# Patient Record
Sex: MALE | Race: WHITE | Employment: OTHER | URBAN - METROPOLITAN AREA
[De-identification: names, ages, dates, MRNs, and addresses within clinical notes are randomized per-mention and may not be internally consistent; named-entity substitution may affect disease eponyms.]

---

## 2023-11-27 ENCOUNTER — HOSPITAL ENCOUNTER (INPATIENT)
Age: 33
LOS: 10 days | Discharge: HOME | DRG: 751 | End: 2023-12-07
Attending: EMERGENCY MEDICINE | Admitting: PSYCHIATRY & NEUROLOGY
Payer: MEDICAID

## 2023-11-27 DIAGNOSIS — F32.A DEPRESSION, UNSPECIFIED DEPRESSION TYPE: Primary | ICD-10-CM

## 2023-11-27 LAB
ALBUMIN SERPL-MCNC: 4.2 G/DL (ref 3.5–4.6)
ALP SERPL-CCNC: 96 U/L (ref 35–104)
ALT SERPL-CCNC: 50 U/L (ref 0–41)
AMPHET UR QL SCN: ABNORMAL
ANION GAP SERPL CALCULATED.3IONS-SCNC: 12 MEQ/L (ref 9–15)
APAP SERPL-MCNC: <5 UG/ML (ref 10–30)
AST SERPL-CCNC: 21 U/L (ref 0–40)
BACTERIA URNS QL MICRO: ABNORMAL /HPF
BARBITURATES UR QL SCN: ABNORMAL
BASOPHILS # BLD: 0 K/UL (ref 0–0.2)
BASOPHILS NFR BLD: 0.3 %
BENZODIAZ UR QL SCN: ABNORMAL
BILIRUB SERPL-MCNC: 0.3 MG/DL (ref 0.2–0.7)
BILIRUB UR QL STRIP: NEGATIVE
BUN SERPL-MCNC: 12 MG/DL (ref 6–20)
CALCIUM SERPL-MCNC: 9 MG/DL (ref 8.5–9.9)
CANNABINOIDS UR QL SCN: POSITIVE
CHLORIDE SERPL-SCNC: 103 MEQ/L (ref 95–107)
CHOLEST SERPL-MCNC: 136 MG/DL (ref 0–199)
CK SERPL-CCNC: 62 U/L (ref 0–190)
CLARITY UR: CLEAR
CO2 SERPL-SCNC: 23 MEQ/L (ref 20–31)
COCAINE UR QL SCN: ABNORMAL
COLOR UR: YELLOW
CREAT SERPL-MCNC: 0.46 MG/DL (ref 0.7–1.2)
DRUG SCREEN COMMENT UR-IMP: ABNORMAL
EOSINOPHIL # BLD: 0 K/UL (ref 0–0.7)
EOSINOPHIL NFR BLD: 0.2 %
EPI CELLS #/AREA URNS AUTO: ABNORMAL /HPF (ref 0–5)
ERYTHROCYTE [DISTWIDTH] IN BLOOD BY AUTOMATED COUNT: 13.2 % (ref 11.5–14.5)
ETHANOL PERCENT: NORMAL G/DL
ETHANOLAMINE SERPL-MCNC: <10 MG/DL (ref 0–0.08)
FENTANYL SCREEN, URINE: ABNORMAL
GLOBULIN SER CALC-MCNC: 2.8 G/DL (ref 2.3–3.5)
GLUCOSE SERPL-MCNC: 94 MG/DL (ref 70–99)
GLUCOSE UR STRIP-MCNC: NEGATIVE MG/DL
HCT VFR BLD AUTO: 50.1 % (ref 42–52)
HDLC SERPL-MCNC: 36 MG/DL (ref 40–59)
HGB BLD-MCNC: 16.5 G/DL (ref 14–18)
HGB UR QL STRIP: ABNORMAL
HYALINE CASTS #/AREA URNS AUTO: ABNORMAL /HPF (ref 0–5)
KETONES UR STRIP-MCNC: ABNORMAL MG/DL
LDLC SERPL CALC-MCNC: 83 MG/DL (ref 0–129)
LEUKOCYTE ESTERASE UR QL STRIP: NEGATIVE
LYMPHOCYTES # BLD: 1.5 K/UL (ref 1–4.8)
LYMPHOCYTES NFR BLD: 14 %
MCH RBC QN AUTO: 29 PG (ref 27–31.3)
MCHC RBC AUTO-ENTMCNC: 32.9 % (ref 33–37)
MCV RBC AUTO: 88 FL (ref 79–92.2)
METHADONE UR QL SCN: ABNORMAL
MONOCYTES # BLD: 0.4 K/UL (ref 0.2–0.8)
MONOCYTES NFR BLD: 3.5 %
NEUTROPHILS # BLD: 8.9 K/UL (ref 1.4–6.5)
NEUTS SEG NFR BLD: 81.7 %
NITRITE UR QL STRIP: POSITIVE
OPIATES UR QL SCN: ABNORMAL
OXYCODONE UR QL SCN: ABNORMAL
PCP UR QL SCN: ABNORMAL
PH UR STRIP: 6 [PH] (ref 5–9)
PLATELET # BLD AUTO: 335 K/UL (ref 130–400)
POTASSIUM SERPL-SCNC: 3.6 MEQ/L (ref 3.4–4.9)
PROPOXYPH UR QL SCN: ABNORMAL
PROT SERPL-MCNC: 7 G/DL (ref 6.3–8)
PROT UR STRIP-MCNC: NEGATIVE MG/DL
RBC # BLD AUTO: 5.69 M/UL (ref 4.7–6.1)
RBC #/AREA URNS AUTO: ABNORMAL /HPF (ref 0–5)
SALICYLATES SERPL-MCNC: <0.3 MG/DL (ref 15–30)
SODIUM SERPL-SCNC: 138 MEQ/L (ref 135–144)
SP GR UR STRIP: 1.01 (ref 1–1.03)
TRIGL SERPL-MCNC: 83 MG/DL (ref 0–150)
TSH SERPL-MCNC: 1.59 UIU/ML (ref 0.44–3.86)
URINE REFLEX TO CULTURE: ABNORMAL
UROBILINOGEN UR STRIP-ACNC: 0.2 E.U./DL
WBC # BLD AUTO: 10.9 K/UL (ref 4.8–10.8)
WBC #/AREA URNS AUTO: ABNORMAL /HPF (ref 0–5)

## 2023-11-27 PROCEDURE — 80143 DRUG ASSAY ACETAMINOPHEN: CPT

## 2023-11-27 PROCEDURE — 80053 COMPREHEN METABOLIC PANEL: CPT

## 2023-11-27 PROCEDURE — 80179 DRUG ASSAY SALICYLATE: CPT

## 2023-11-27 PROCEDURE — 85025 COMPLETE CBC W/AUTO DIFF WBC: CPT

## 2023-11-27 PROCEDURE — 99285 EMERGENCY DEPT VISIT HI MDM: CPT

## 2023-11-27 PROCEDURE — 36415 COLL VENOUS BLD VENIPUNCTURE: CPT

## 2023-11-27 PROCEDURE — 82550 ASSAY OF CK (CPK): CPT

## 2023-11-27 PROCEDURE — 82077 ASSAY SPEC XCP UR&BREATH IA: CPT

## 2023-11-27 PROCEDURE — 80061 LIPID PANEL: CPT

## 2023-11-27 PROCEDURE — 1240000000 HC EMOTIONAL WELLNESS R&B

## 2023-11-27 PROCEDURE — 81001 URINALYSIS AUTO W/SCOPE: CPT

## 2023-11-27 PROCEDURE — 80307 DRUG TEST PRSMV CHEM ANLYZR: CPT

## 2023-11-27 PROCEDURE — 84443 ASSAY THYROID STIM HORMONE: CPT

## 2023-11-27 RX ORDER — HALOPERIDOL 5 MG/ML
5 INJECTION INTRAMUSCULAR EVERY 6 HOURS PRN
Status: DISCONTINUED | OUTPATIENT
Start: 2023-11-27 | End: 2023-12-07 | Stop reason: HOSPADM

## 2023-11-27 RX ORDER — HALOPERIDOL 5 MG/1
5 TABLET ORAL EVERY 6 HOURS PRN
Status: DISCONTINUED | OUTPATIENT
Start: 2023-11-27 | End: 2023-12-07 | Stop reason: HOSPADM

## 2023-11-27 RX ORDER — TRAZODONE HYDROCHLORIDE 50 MG/1
50 TABLET ORAL NIGHTLY PRN
Status: DISCONTINUED | OUTPATIENT
Start: 2023-11-27 | End: 2023-12-07 | Stop reason: HOSPADM

## 2023-11-27 RX ORDER — HYDROXYZINE PAMOATE 50 MG/1
50 CAPSULE ORAL EVERY 6 HOURS PRN
Status: DISCONTINUED | OUTPATIENT
Start: 2023-11-27 | End: 2023-12-07 | Stop reason: HOSPADM

## 2023-11-27 RX ORDER — ACETAMINOPHEN 325 MG/1
650 TABLET ORAL EVERY 4 HOURS PRN
Status: DISCONTINUED | OUTPATIENT
Start: 2023-11-27 | End: 2023-12-07 | Stop reason: HOSPADM

## 2023-11-27 RX ORDER — BENZTROPINE MESYLATE 1 MG/ML
2 INJECTION INTRAMUSCULAR; INTRAVENOUS 2 TIMES DAILY PRN
Status: DISCONTINUED | OUTPATIENT
Start: 2023-11-27 | End: 2023-12-07 | Stop reason: HOSPADM

## 2023-11-27 RX ORDER — MAGNESIUM HYDROXIDE/ALUMINUM HYDROXICE/SIMETHICONE 120; 1200; 1200 MG/30ML; MG/30ML; MG/30ML
30 SUSPENSION ORAL PRN
Status: DISCONTINUED | OUTPATIENT
Start: 2023-11-27 | End: 2023-12-07 | Stop reason: HOSPADM

## 2023-11-27 RX ORDER — HYDROXYZINE HYDROCHLORIDE 50 MG/ML
50 INJECTION, SOLUTION INTRAMUSCULAR EVERY 6 HOURS PRN
Status: DISCONTINUED | OUTPATIENT
Start: 2023-11-27 | End: 2023-12-07 | Stop reason: HOSPADM

## 2023-11-27 ASSESSMENT — SLEEP AND FATIGUE QUESTIONNAIRES
DO YOU USE A SLEEP AID: NO
AVERAGE NUMBER OF SLEEP HOURS: 5
SLEEP PATTERN: RESTLESSNESS
DO YOU HAVE DIFFICULTY SLEEPING: YES

## 2023-11-27 ASSESSMENT — PAIN - FUNCTIONAL ASSESSMENT: PAIN_FUNCTIONAL_ASSESSMENT: NONE - DENIES PAIN

## 2023-11-27 ASSESSMENT — ENCOUNTER SYMPTOMS
ALLERGIC/IMMUNOLOGIC NEGATIVE: 1
RESPIRATORY NEGATIVE: 1
GASTROINTESTINAL NEGATIVE: 1
EYES NEGATIVE: 1

## 2023-11-27 NOTE — ED TRIAGE NOTES
Patient to [facility]IL[facility] OF [facility] via stretcher. States he is just done does not want to live anymore. Patient took self in motorized scooter to ST[facility] himself. Per [facility] he voiced suicidal thoughts with a plan and intent 8/10 to cut self. Patient denied this upon arrival. Stated he voiced suicidal ideation but stated he was not going to harm himself and was upset that he was here.

## 2023-11-27 NOTE — ED NOTES
at bedside, patient cooperative     Anselmo Cisneros RN  11/27/23 0038
Asked patient if he is able to give urine specimen this time, reports he still can not go. Requesting to eat something at this time. Patient assisted to sitting position in bed, lunch tray given. PO fluids encouraged. Patients states he will try to provide urine specimen after he finishes eating.       Roberta Roberto RN  11/27/23 1746
Dr. Lomas Self at bedside. Pt provided water. Denies needing anything else.       Ev Clark RN  11/27/23 2957
Per pt request attempted to call mother Piter Perez [phone]. Left voice message.       Marline Sosa RN  11/27/23 2526
Pt provided water and lunch tray. Pt stated he did not want to eat lunch. Pt resting in bed.      Miranda Wisdom RN  11/27/23 7995
Pt still unable to void at this time. Pt assisted to lying position. No distress noted.      Power Gallegos RN  11/27/23 8082
Pt to [facility] bed 6. Changed into psych safe clothing. Skin intact. Pt could not void at this time. Belongings secured and no contraband found. Zone 2 notified of new pt. Lab called and notified of new orders.       Christiana Linn RN  11/27/23 2395
Reviewed pt with Dr. Monty Castillo. Reviewed past hx, current assessment, labs and home meds. Received order to admit to 3W. Asked to call 3W director Russel and to let her know of pts needs/medical acuity.       Karlie Tomas RN  11/27/23 7473
Verbal:Denies    Attempt:Denies      Self-Injurious/Self-Mutilation:Denies      Violence Current or Past: Denies      Trauma Identified: Denies    Protective Factors:    [address] when needed      Risk Factors:    Not receiving psych services  Chronic medical conditions(disabled)      Clinical Summary:    Pt presents to ED via ambulance after being pink slipped by [facility] [place] with plan to cut self. Reports increased depression and anxiety. Flat affect and poor eye contact. Pt has chronic medical conditions that has decreased his quality of life. Pt has feelings of hopelessness and poor self esteem. Reports not wanting to live anymore and wants to just give up. Denies hallucinations. Does report some paranoia. Stated he feels others are out to get him and and feels he is being watched. Reports he has not had any past mental health  issues or admissions. Denies taking any medications. Reports poor sleep, sleeping only 5 hours a night  and decrease in appetite.          Level of Care Disposition:    Per Dr. Madison Vargas, [address] RN  11/27/23 7820

## 2023-11-27 NOTE — ED PROVIDER NOTES
[facility] ED  EMERGENCY DEPARTMENT ENCOUNTER      Pt Name: Evan Martinez  MRN: [id number]  [address] [facility] [place] 1990  Date of evaluation: 11/27/2023  Provider: Nicole Pagan MD    CHIEF COMPLAINT       Chief Complaint   Patient presents with    Mental Health Problem     Patient is depressed, doesn't want to live anymore. Tasley slipped from [facility] (SI to cut self)          HISTORY OF PRESENT ILLNESS   (Location/Symptom, Timing/Onset, Context/Setting, Quality, Duration, Modifying Factors, Severity)  Note limiting factors. Evan Martinez is a 35 y.o. male who presents to the emergency department. This is a 75-year-old male with a past medical history of paraplegia who presents for suicidal ideation. Patient states that typically over the holiday season he becomes more depressed and he states that he recently he has felt suicidal.  He denies any homicidal ideation or auditory/visual hallucinations. He otherwise denies any recent fevers, chills, body aches, runny nose, cough, earache, sore throat, chest pain, shortness of breath, abdominal pain, nausea, vomiting, diarrhea, or dysuria. HPI    Nursing Notes were reviewed. REVIEW OF SYSTEMS    (2-9 systems for level 4, 10 or more for level 5)     Review of Systems   Constitutional: Negative. HENT: Negative. Eyes: Negative. Respiratory: Negative. Cardiovascular: Negative. Gastrointestinal: Negative. Endocrine: Negative. Genitourinary: Negative. Musculoskeletal: Negative. Skin: Negative. Allergic/Immunologic: Negative. Neurological: Negative. Hematological: Negative. Psychiatric/Behavioral:  Positive for suicidal ideas. All other systems reviewed and are negative. Except as noted above the remainder of the review of systems was reviewed and negative. PAST MEDICAL HISTORY   No past medical history on file. SURGICAL HISTORY     No past surgical history on file.       CURRENT

## 2023-11-28 PROCEDURE — 6370000000 HC RX 637 (ALT 250 FOR IP): Performed by: PSYCHIATRY & NEUROLOGY

## 2023-11-28 PROCEDURE — 99223 1ST HOSP IP/OBS HIGH 75: CPT | Performed by: PSYCHIATRY & NEUROLOGY

## 2023-11-28 PROCEDURE — 1240000000 HC EMOTIONAL WELLNESS R&B

## 2023-11-28 RX ORDER — QUETIAPINE FUMARATE 50 MG/1
50 TABLET, FILM COATED ORAL NIGHTLY
Status: DISCONTINUED | OUTPATIENT
Start: 2023-11-28 | End: 2023-12-07 | Stop reason: HOSPADM

## 2023-11-28 RX ADMIN — SERTRALINE 50 MG: 50 TABLET, FILM COATED ORAL at 16:28

## 2023-11-28 RX ADMIN — HYDROXYZINE PAMOATE 50 MG: 50 CAPSULE ORAL at 04:08

## 2023-11-28 RX ADMIN — QUETIAPINE FUMARATE 50 MG: 50 TABLET ORAL at 20:44

## 2023-11-28 NOTE — CARE COORDINATION
Psychosocial Assessment    Current Level of Psychosocial Functioning     Independent   Dependent  X  Minimal Assist     Comments:      Psychosocial High Risk Factors (check all that apply)    Unable to obtain meds   Chronic illness/pain  X  Substance abuse   Lack of Family Support X  Financial stress   Isolation X  Inadequate Community Resources X  Suicide attempt(s)  Not taking medications X  Victim of crime   Developmental Delay  Unable to manage personal needs  X  Age 72 or older   Homeless  No transportation   Readmission within 30 days  Unemployment  Traumatic Event    Family/Supports identified:   None    Sexual Orientation:    Patient did not disclose    Patient Strengths:  Stable Housing    Patient Barriers:   Not receiving psych services  Chronic medical conditions (disabled)    Safety plan:  Completed    CMHC/MH history:  Depression  Plan of Care:  medication management, group/individual therapies, family meetings, psycho -education, treatment team meetings to assist with stabilization    Initial Discharge Plan:    Return Home alone with caregivers/ Allied Services. Clinical Summary:  Patient is a 35year old male recently admitted to the [facility] for a mental health evaluation. Paitent reports increased depression, paranoia and anxiety. Patient has flat affect and poor eye contact. Pt has chronic medical conditions that has decreased his quality of life and has caregivers in his home to help with ADLS daily. Pt has feelings of hopelessness and poor self esteem. Reports not wanting to live anymore and wants to just give up. Patient reports he has not had any past mental health  issues or admissions. Patients family resides in [facility] and he lacks family and peer support services.     Electronically signed by JEROME Velazquez on 11/28/2023 at 11:49 AM

## 2023-11-28 NOTE — GROUP NOTE
Group Therapy Note    Date: 11/28/2023    Group Start Time: 8954  Group End Time: 1815  Group Topic: Recreational    [facility]    John Harkins RN        Group Therapy Note    Attendees: 7/21           Patient's Goal: Socialization Skills    Status After Intervention:  Improved    Participation Level: Active Listener    Participation Quality: Appropriate      Speech:  normal      Thought Process/Content: Logical      Affective Functioning: Congruent      Mood: euthymic      Level of consciousness:  Oriented x4      Response to Learning: Progressing to goal      Endings: None Reported    Modes of Intervention: Education      Discipline Responsible: Registered Nurse      Signature:   John Harkins RN

## 2023-11-29 PROCEDURE — 1240000000 HC EMOTIONAL WELLNESS R&B

## 2023-11-29 PROCEDURE — 99233 SBSQ HOSP IP/OBS HIGH 50: CPT | Performed by: PSYCHIATRY & NEUROLOGY

## 2023-11-29 PROCEDURE — 6370000000 HC RX 637 (ALT 250 FOR IP): Performed by: PSYCHIATRY & NEUROLOGY

## 2023-11-29 RX ADMIN — QUETIAPINE FUMARATE 50 MG: 50 TABLET ORAL at 20:51

## 2023-11-29 RX ADMIN — SERTRALINE 50 MG: 50 TABLET, FILM COATED ORAL at 08:29

## 2023-11-29 NOTE — CONSULTS
PODIATRIC MEDICINE AND SURGERY  CONSULT HISTORY AND PHYSICAL    Consulting Service:  Psych  Requesting Provider: Shreya Cardenas  Opinion/advice regarding: Elongated Nails  Staff Doctor:  Dot Reddy DPM    ASSESSMENT:     35 y.o. male with PMH significant for depression with SI and paranoia for who podiatry was consulted for nail debridement as he is paraplegic without transportation for outpatient visits. PLAN AND RECOMMENDATIONS:     - Nails 1-5 B/L were debrided with nail nippers without incident.   - Patient will need follow up with podiatry in 2-3 months for routine nail care. - Podiatry to sign off. Please re-consult for any questions or concerns. Patient's case to be discussed with staff, Dr. Nathaniel Tamez, who will provide final recommendations going forward    SUBJECTIVE:     HPI: This 35y.o. year old male was seen today for nail debridement. Patient states that he is unable to manage his own nail care due to paraplegia. States he used to see a podiatrist but cannot recall name, states he has been unable to see provider for some times. Patient denies nausea, vomiting, diarrhea, fevers, chills, chest pain, shortness of breath, head ache, or calf pain. No other pedal complaints. Past Medical History:   Diagnosis Date    Arthrogryposis     Paraplegia ([address])     Scoliosis        No past surgical history on file. No current facility-administered medications on file prior to encounter. No current outpatient medications on file prior to encounter. No Known Allergies    No family history on file.     Social History     Socioeconomic History    Marital status: Single     Spouse name: Not on file    Number of children: Not on file    Years of education: Not on file    Highest education level: Not on file   Occupational History    Not on file   Tobacco Use    Smoking status: Every Day    Smokeless tobacco: Never   Substance and Sexual Activity    Alcohol use: Yes    Drug use: Yes     Types:

## 2023-11-29 NOTE — CARE COORDINATION
Found long metal aries in patient's room. Pt states he uses it to grab things. Pt was agreeable to allow nurse to place it in his locker. Pt aware that he can ask his 1:1 for anything he needs.

## 2023-11-29 NOTE — GROUP NOTE
Group Therapy Note    Date: 11/29/2023    Group Start Time: 1430  Group End Time: 1500  Group Topic: Healthy Living/Wellness    [facility] 3[facility] I    , FRACISCO Salgado        Group Therapy Note    Attendees:   10/19       Patient's Goal:  To participate in group     Notes:  Appropriate behavior     Status After Intervention:  Improved    Participation Level: Interactive    Participation Quality: Appropriate      Speech:  normal      Thought Process/Content: Logical      Affective Functioning: Congruent      Mood: euthymic      Level of consciousness:  Alert and Oriented x4      Response to Learning: Progressing to goal      Endings: None Reported    Modes of Intervention: Support and Socialization      Discipline Responsible: Registered Nurse      Signature:  Tiffany Menezes RN

## 2023-11-29 NOTE — GROUP NOTE
Group Therapy Note    Date: 11/29/2023    Group Start Time: 1000  Group End Time: 1100  Group Topic: Recreational    [facility] 3[facility] Jessica Solis        Group Therapy Note    Attendees: 7       Patient's Goal:  To take my medication     Notes:  Pt was attentive     Status After Intervention:  Unchanged    Participation Level: Interactive    Participation Quality: Attentive      Speech:  normal      Thought Process/Content: Logical      Affective Functioning: Congruent      Mood: euthymic      Level of consciousness:  Alert      Response to Learning: Able to verbalize current knowledge/experience      Endings: None Reported    Modes of Intervention: Activity      Discipline Responsible: Behavorial Health Tech      Signature:   Michael Arzate

## 2023-11-29 NOTE — GROUP NOTE
Group Therapy Note    Date: 11/28/2023    Group Start Time: 1941  Group End Time: 2030  Group Topic: Wrap-Up    [facility]    Aruna Galvez        Group Therapy Note    Attendees: 10/20       Patient's Goal:  PT goal is to go to bed early and wake up early      Status After Intervention:  Unchanged    Participation Level: Interactive    Participation Quality: Appropriate      Speech:  normal      Thought Process/Content: Logical      Affective Functioning: Congruent      Mood: euthymic      Level of consciousness:  Alert      Response to Learning: Able to verbalize current knowledge/experience      Endings: None Reported    Modes of Intervention: Socialization      Discipline Responsible: PCA      Signature:  Aruna Galvez

## 2023-11-29 NOTE — GROUP NOTE
Group Therapy Note    Date: 11/29/2023    Group Start Time: 1300  Group End Time: 1330  Group Topic: Cognitive Skills    [facility]    Patricia Ruiz, RN        Group Therapy Note    Attendees: 7/18     Patient's Goal:  to learn strategies for good coping. Status After Intervention:  Improved    Participation Level: Active Listener    Participation Quality: Appropriate, Attentive, and Sharing      Speech:  normal      Thought Process/Content: Logical      Affective Functioning: Congruent      Mood: euthymic      Level of consciousness:  Alert and Oriented x4      Response to Learning: Able to verbalize/acknowledge new learning and Capable of insight      Endings: None Reported    Modes of Intervention: Education, Support, Socialization, and Problem-solving      Discipline Responsible: Registered Nurse      Signature: Isela Carter

## 2023-11-29 NOTE — CARE COORDINATION
Pt had bowel movement and used the urinal.  Pt cleaned and new brief put on pt.   Pt transferred to bed with 4 person assist.

## 2023-11-30 PROBLEM — F32.3 CURRENT SEVERE EPISODE OF MAJOR DEPRESSIVE DISORDER WITH PSYCHOTIC FEATURES WITHOUT PRIOR EPISODE (HCC): Status: ACTIVE | Noted: 2023-11-27

## 2023-11-30 PROCEDURE — 1240000000 HC EMOTIONAL WELLNESS R&B

## 2023-11-30 PROCEDURE — 6370000000 HC RX 637 (ALT 250 FOR IP): Performed by: PSYCHIATRY & NEUROLOGY

## 2023-11-30 PROCEDURE — 99232 SBSQ HOSP IP/OBS MODERATE 35: CPT | Performed by: PSYCHIATRY & NEUROLOGY

## 2023-11-30 RX ORDER — QUETIAPINE FUMARATE 25 MG/1
25 TABLET, FILM COATED ORAL EVERY MORNING
Status: DISCONTINUED | OUTPATIENT
Start: 2023-12-01 | End: 2023-12-07 | Stop reason: HOSPADM

## 2023-11-30 RX ADMIN — SERTRALINE 50 MG: 50 TABLET, FILM COATED ORAL at 08:45

## 2023-11-30 RX ADMIN — QUETIAPINE FUMARATE 50 MG: 50 TABLET ORAL at 20:47

## 2023-11-30 NOTE — GROUP NOTE
Group Therapy Note    Date: 11/30/2023    Group Start Time: 1000  Group End Time: 1100  Group Topic: Recreational    [facility] Jessica Solis        Group Therapy Note    Attendees: 8       Patient's Goal:  To finish the day    Notes:  Pt was attentive     Status After Intervention:  Unchanged    Participation Level: Interactive    Participation Quality: Attentive      Speech:  normal      Thought Process/Content: Logical      Affective Functioning: Congruent      Mood: euthymic      Level of consciousness:  Alert      Response to Learning: Able to verbalize current knowledge/experience      Endings: None Reported    Modes of Intervention: Activity      Discipline Responsible: Behavorial Health Tech      Signature:   Leslee Jeans

## 2023-11-30 NOTE — GROUP NOTE
Group Therapy Note    Date: 11/29/2023    Group Start Time: 1900  Group End Time: 2000  Group Topic: Wrap-Up    [facility]    Julianna Olvera        Group Therapy Note    Attendees: 6/19       Patient's Goal:  PT goal is to take meds        Status After Intervention:  Unchanged    Participation Level: Interactive    Participation Quality: Attentive      Speech:  normal      Thought Process/Content: Logical      Affective Functioning: Congruent      Mood: euthymic      Level of consciousness:  Attentive      Response to Learning: Able to verbalize current knowledge/experience      Endings: None Reported    Modes of Intervention: Socialization      Discipline Responsible: PCA      Signature:  Julianna Olvera

## 2023-11-30 NOTE — GROUP NOTE
Group Therapy Note    Date: 11/30/2023    Group Start Time: 1350  Group End Time: 3569  Group Topic: Nursing    [facility]    Vicky Eldridge RN        Group Therapy Note    Attendees: 8/22       Patient's Goal:  Learning the benefits of yoga- exploring different types of coping skills     Status After Intervention:  Improved    Participation Level: Minimal    Participation Quality: Attentive      Speech:  normal      Thought Process/Content: Logical      Affective Functioning: Congruent      Mood: euthymic      Level of consciousness:  Oriented x4      Response to Learning: Able to verbalize current knowledge/experience      Endings: None Reported    Modes of Intervention: Education, Socialization, and Exploration      Discipline Responsible: Registered Nurse      Signature:  Vicky Eldridge RN

## 2023-11-30 NOTE — GROUP NOTE
Group Therapy Note    Date: 11/30/2023    Group Start Time: 1300  Group End Time: 8463  Group Topic: Music Therapy    [facility]    Marcie Foreman RN        Group Therapy Note    Attendees: 8/21       Patient's Goal:   Learning coping skills through music.       Status After Intervention:  Improved    Participation Level: Interactive    Participation Quality: Sharing      Speech:  normal      Thought Process/Content: Logical      Affective Functioning: Congruent      Mood: anxious      Level of consciousness:  Alert      Response to Learning: Able to verbalize current knowledge/experience      Endings: None Reported    Modes of Intervention: Education, Socialization, and Exploration      Discipline Responsible: Registered Nurse      Signature:  Marcie Foreman RN

## 2023-11-30 NOTE — GROUP NOTE
Group Therapy Note    Date: 11/29/2023    Group Start Time: 1900  Group End Time: 1930  Group Topic: Recreational    [facility] I    Jamia Vincent        Group Therapy Note    Attendees: 9/19       Patient's Goal:  PT was able to socialize while playing the wii        Status After Intervention:  Unchanged    Participation Level: Interactive    Participation Quality: Attentive      Speech:  normal      Thought Process/Content: Logical      Affective Functioning: Congruent      Mood: euthymic      Level of consciousness:  Attentive      Response to Learning: Able to verbalize current knowledge/experience      Endings: None Reported    Modes of Intervention: Activity      Discipline Responsible: PCA      Signature:  Jamia Vincent

## 2023-12-01 PROCEDURE — 6370000000 HC RX 637 (ALT 250 FOR IP): Performed by: PSYCHIATRY & NEUROLOGY

## 2023-12-01 PROCEDURE — 1240000000 HC EMOTIONAL WELLNESS R&B

## 2023-12-01 PROCEDURE — 90833 PSYTX W PT W E/M 30 MIN: CPT | Performed by: PSYCHIATRY & NEUROLOGY

## 2023-12-01 PROCEDURE — 99232 SBSQ HOSP IP/OBS MODERATE 35: CPT | Performed by: PSYCHIATRY & NEUROLOGY

## 2023-12-01 RX ADMIN — QUETIAPINE FUMARATE 50 MG: 50 TABLET ORAL at 20:48

## 2023-12-01 RX ADMIN — SERTRALINE 50 MG: 50 TABLET, FILM COATED ORAL at 08:23

## 2023-12-01 RX ADMIN — QUETIAPINE FUMARATE 25 MG: 25 TABLET ORAL at 08:23

## 2023-12-01 NOTE — GROUP NOTE
Group Therapy Note    Date: 12/1/2023    Group Start Time: 0900  Group End Time: 0918  Group Topic: Community Meeting    [facility] DANNY Wilkins        Group Therapy Note    Attendees: 19/22       Patient's Goal:  PT goal is to get through new medication and to also get clothes        Status After Intervention:  Unchanged    Participation Level: Interactive    Participation Quality: Attentive      Speech:  normal      Thought Process/Content: Logical      Affective Functioning: Congruent      Mood: euthymic      Level of consciousness:  Attentive      Response to Learning: Able to verbalize current knowledge/experience      Endings: None Reported    Modes of Intervention: Socialization      Discipline Responsible: PCA      Signature:  Fito Wilkins

## 2023-12-01 NOTE — GROUP NOTE
Group Therapy Note    Date: 12/1/2023    Group Start Time: 7513  Group End Time: 1115  Group Topic: Activity    [facility]    Antonino Sabillon        Group Therapy Note    Attendees: 11/22       Patient's Goal:  PT was able to socialize while playing the ball and cup game        Status After Intervention:  Unchanged    Participation Level: Interactive    Participation Quality: Attentive      Speech:  normal      Thought Process/Content: Logical      Affective Functioning: Congruent      Mood: euthymic      Level of consciousness:  Attentive      Response to Learning: Able to verbalize current knowledge/experience      Endings: None Reported    Modes of Intervention: Activity      Discipline Responsible: PCA      Signature:  Antonino Sabillon

## 2023-12-01 NOTE — GROUP NOTE
Group Therapy Note    Date: 12/1/2023    Group Start Time: 1500  Group End Time: 6522  Group Topic: Cognitive Skills    [facility] I    Rene Zamarripa        Group Therapy Note    Attendees: 10/17       Patient's Goal:  PT played pictionary      Status After Intervention:  Unchanged    Participation Level: Interactive    Participation Quality: Attentive      Speech:  normal      Thought Process/Content: Logical      Affective Functioning: Congruent      Mood: euthymic      Level of consciousness:  Attentive      Response to Learning: Able to verbalize current knowledge/experience      Endings: None Reported    Modes of Intervention: non      Discipline Responsible: ALONSO      Signature:  Rene Zamarripa

## 2023-12-02 PROCEDURE — 1240000000 HC EMOTIONAL WELLNESS R&B

## 2023-12-02 PROCEDURE — 6370000000 HC RX 637 (ALT 250 FOR IP): Performed by: PSYCHIATRY & NEUROLOGY

## 2023-12-02 RX ADMIN — QUETIAPINE FUMARATE 50 MG: 50 TABLET ORAL at 21:18

## 2023-12-02 RX ADMIN — QUETIAPINE FUMARATE 25 MG: 25 TABLET ORAL at 09:32

## 2023-12-02 RX ADMIN — SERTRALINE 50 MG: 50 TABLET, FILM COATED ORAL at 09:32

## 2023-12-02 NOTE — GROUP NOTE
Group Therapy Note    Date: 12/2/2023    Group Start Time: 1300  Group End Time: 0177  Group Topic: Healthy Living/Wellness    [facility] Jessica Solis        Group Therapy Note    Attendees: 10       Patient's Goal:  To attend group    Notes:  Pt was attentive     Status After Intervention:  Unchanged    Participation Level: Interactive    Participation Quality: Attentive      Speech:  normal      Thought Process/Content: Logical      Affective Functioning: Congruent      Mood: euthymic      Level of consciousness:  Alert      Response to Learning: Able to verbalize current knowledge/experience      Endings: None Reported    Modes of Intervention: Activity      Discipline Responsible: Behavorial Health Tech      Signature:   Elissa Juares

## 2023-12-02 NOTE — GROUP NOTE
Group Therapy Note    Date: 12/2/2023    Group Start Time: 1630  Group End Time: 1800  Group Topic: Healthy Living/Wellness    [facility]    Wesley Lopes LPN        Group Therapy Note    Attendees: 10/11       Patient's Goal:  n/a      Notes:  pt. Participated in group     Status After Intervention:  Improved    Participation Level:  Active Listener    Participation Quality: Appropriate      Speech:  normal      Thought Process/Content: Logical      Affective Functioning: Congruent        Level of consciousness:  A&o x4      Response to Learning: active listener      Endings: None Reported    Modes of Intervention: Education and Support      Discipline Responsible: Licensed Practical Nurse      Signature:  Wesley Lopes LPN

## 2023-12-02 NOTE — GROUP NOTE
Group Therapy Note    Date: 12/2/2023    Group Start Time: 1000  Group End Time: 1100  Group Topic: Recreational    [facility] Jessica Solis        Group Therapy Note    Attendees: 11       Patient's Goal:  Talk to OT    Notes:  Pt was attentive     Status After Intervention:  Unchanged    Participation Level: Interactive    Participation Quality: Attentive      Speech:  mute      Thought Process/Content: Logical      Affective Functioning: Congruent      Mood: euthymic      Level of consciousness:  Alert      Response to Learning: Able to verbalize current knowledge/experience      Endings: None Reported    Modes of Intervention: Activity      Discipline Responsible: Behavorial Health Tech      Signature:   Hodan Conteh

## 2023-12-03 PROCEDURE — 6370000000 HC RX 637 (ALT 250 FOR IP): Performed by: PSYCHIATRY & NEUROLOGY

## 2023-12-03 PROCEDURE — 1240000000 HC EMOTIONAL WELLNESS R&B

## 2023-12-03 RX ADMIN — SERTRALINE 50 MG: 50 TABLET, FILM COATED ORAL at 08:31

## 2023-12-03 RX ADMIN — QUETIAPINE FUMARATE 25 MG: 25 TABLET ORAL at 08:31

## 2023-12-03 RX ADMIN — QUETIAPINE FUMARATE 50 MG: 50 TABLET ORAL at 21:24

## 2023-12-03 NOTE — GROUP NOTE
Group Therapy Note    Date: 12/3/2023    Group Start Time: 2030  Group End Time: 2045  Group Topic: Wrap-Up    [facility]    Cora Ball RN        Group Therapy Note    Attendees: 16/22       Patient's Goal:  To see occupational Therapy    Notes:  Progressing    Status After Intervention:  Unchanged    Participation Level:  Active Listener    Participation Quality: Appropriate      Speech:  normal      Thought Process/Content: Logical      Affective Functioning: Congruent      Mood: euthymic      Level of consciousness:  Alert      Response to Learning: Progressing to goal      Endings: None Reported    Modes of Intervention: Support      Discipline Responsible: Registered Nurse      Signature:  Cora Ball RN

## 2023-12-03 NOTE — GROUP NOTE
Group Therapy Note    Date: 12/3/2023    Group Start Time: 1000  Group End Time: 1100  Group Topic: Recreational    [facility] Jessica Solis        Group Therapy Note    Attendees: 10       Patient's Goal:  To not have an accident     Notes:  Pt was attentive     Status After Intervention:  Unchanged    Participation Level: Interactive    Participation Quality: Attentive      Speech:  normal      Thought Process/Content: Logical      Affective Functioning: Congruent      Mood: euthymic      Level of consciousness:  Alert      Response to Learning: Able to verbalize current knowledge/experience      Endings: None Reported    Modes of Intervention:       Discipline Responsible: Behavorial Health Tech      Signature:   Mitzy Felder

## 2023-12-03 NOTE — GROUP NOTE
Group Therapy Note    Date: 12/3/2023    Group Start Time: 1400  Group End Time: 1430  Group Topic: Healthy Living/Wellness    [facility] 3W Jessica Solis        Group Therapy Note    Attendees: 10       Patient's Goal:  To attend group    Notes:  Pt was attentive     Status After Intervention:  Unchanged    Participation Level: Interactive    Participation Quality: Attentive      Speech:  normal      Thought Process/Content: Logical      Affective Functioning: Congruent      Mood: euthymic      Level of consciousness:  Alert      Response to Learning: Able to verbalize current knowledge/experience      Endings: None Reported    Modes of Intervention: Activity      Discipline Responsible: Behavorial Health Tech      Signature:   Dary Green

## 2023-12-04 PROCEDURE — 99232 SBSQ HOSP IP/OBS MODERATE 35: CPT | Performed by: PSYCHIATRY & NEUROLOGY

## 2023-12-04 PROCEDURE — 1240000000 HC EMOTIONAL WELLNESS R&B

## 2023-12-04 PROCEDURE — 6370000000 HC RX 637 (ALT 250 FOR IP): Performed by: PSYCHIATRY & NEUROLOGY

## 2023-12-04 RX ORDER — LOPERAMIDE HYDROCHLORIDE 2 MG/1
2 CAPSULE ORAL 4 TIMES DAILY PRN
Status: DISCONTINUED | OUTPATIENT
Start: 2023-12-04 | End: 2023-12-07 | Stop reason: HOSPADM

## 2023-12-04 RX ADMIN — QUETIAPINE FUMARATE 50 MG: 50 TABLET ORAL at 21:10

## 2023-12-04 RX ADMIN — QUETIAPINE FUMARATE 25 MG: 25 TABLET ORAL at 09:28

## 2023-12-04 RX ADMIN — SERTRALINE 50 MG: 50 TABLET, FILM COATED ORAL at 09:29

## 2023-12-04 NOTE — GROUP NOTE
Group Therapy Note    Date: 2023    Group Start Time: 1000  Group End Time: 1100  Group Topic: Activity    [facility] 3W JEIMY Tony        Group Therapy Note    Attendees:          Patient's Goal:  ***    Notes:  ***    Status After Intervention:  {Status After Intervention:877194397}    Participation Level: {Participation Level:457799101}    Participation Quality: {[facility] PARTICIPATION QUALITY:110340747}      Speech:  {[facility] CD_SPEECH:26885}      Thought Process/Content: {Thought Process/Content:038656087}      Affective Functioning: {Affective Functionin}      Mood: {Mood:399218955}      Level of consciousness:  {Level of consciousness:525768798}      Response to Learnin [facility] Responses to Learnin}      Endings: {[facility] Endings:53924}    Modes of Intervention: {[facility] Modes of Intervention:744285266}      Discipline Responsible: {[facility] Multidisciplinary:701717886}      Signature:  Niko Tony

## 2023-12-04 NOTE — GROUP NOTE
Group Therapy Note    Date: 12/3/2023    Group Start Time: 1930  Group End Time: 2000  Group Topic: Wrap-Up    [facility]    Jinny Beverly RN    To attend wrap up group and state whether or not goal was met. Group Therapy Note    Attendees: 21/24       Patient's Goal:  To talk to friends    Notes:  progressing     Status After Intervention:  Unchanged    Participation Level:  Active Listener    Participation Quality: Appropriate      Speech:  normal      Thought Process/Content: Logical      Affective Functioning: Congruent      Mood: euthymic      Level of consciousness:  Alert      Response to Learning: Progressing to goal      Endings: None Reported    Modes of Intervention: Support      Discipline Responsible: Registered Nurse      Signature:  Jinny Beverly RN

## 2023-12-04 NOTE — GROUP NOTE
Group Therapy Note    Date: 12/4/2023    Group Start Time: 1200  Group End Time: 200  Group Topic: Healthy Living/Wellness    [facility] 3W DANNY Fraga        Group Therapy Note    Attendees: 11/24       Patient's Goal:  PT learned about anger management        Status After Intervention:  Unchanged    Participation Level: Interactive    Participation Quality: Attentive      Speech:  normal      Thought Process/Content: Logical      Affective Functioning: Congruent      Mood: euthymic      Level of consciousness:  Attentive      Response to Learning: Able to verbalize current knowledge/experience      Endings: None Reported    Modes of Intervention: Education      Discipline Responsible: ALONSO      Signature:  Tate Fraga

## 2023-12-04 NOTE — GROUP NOTE
Group Therapy Note    Date: 12/4/2023    Group Start Time: 1300  Group End Time: 1400  Group Topic: Recreational    [facility] 3W I    Oliver Perez        Group Therapy Note    Attendees: 10/21       Patient's Goal:  Pt played trivia and socialized      Status After Intervention:  Unchanged    Participation Level: Interactive    Participation Quality: Attentive      Speech:  normal      Thought Process/Content: Logical      Affective Functioning: Congruent      Mood: euthymic      Level of consciousness:  Attentive      Response to Learning: Able to verbalize current knowledge/experience      Endings: None Reported    Modes of Intervention: Activity      Discipline Responsible: PCA      Signature:  Oliver Perez

## 2023-12-04 NOTE — GROUP NOTE
Group Therapy Note    Date: 12/4/2023    Group Start Time: 1000  Group End Time: 1100  Group Topic: Activity    [facility]    Rene Zamarripa        Group Therapy Note    Attendees: 9/25       Patient's Goal:  PT was able to socialize while doing art group        Status After Intervention:  Unchanged    Participation Level: Interactive    Participation Quality: Attentive      Speech:  normal      Thought Process/Content: Logical      Affective Functioning: Congruent      Mood: euthymic      Level of consciousness:  Attentive      Response to Learning: Able to verbalize current knowledge/experience      Endings: None Reported    Modes of Intervention: Activity      Discipline Responsible: PCA      Signature:  Rene Zamarripa

## 2023-12-04 NOTE — GROUP NOTE
Group Therapy Note    Date: 12/4/2023    Group Start Time: 0900  Group End Time: 0915  Group Topic: Community Meeting    [facility] 3[facility]    Alex Hobson        Group Therapy Note    Attendees: 11/25       Patient's Goal:  PT goal is to get things situated with a therapist        Status After Intervention:  Unchanged    Participation Level: Interactive    Participation Quality: Attentive      Speech:  normal      Thought Process/Content: Logical      Affective Functioning: Congruent      Mood: euthymic      Level of consciousness:  Alert      Response to Learning: Able to verbalize current knowledge/experience      Endings: None Reported    Modes of Intervention: Education      Discipline Responsible: PCA      Signature:  Alex Hobson

## 2023-12-05 PROCEDURE — 1240000000 HC EMOTIONAL WELLNESS R&B

## 2023-12-05 PROCEDURE — 99232 SBSQ HOSP IP/OBS MODERATE 35: CPT | Performed by: PSYCHIATRY & NEUROLOGY

## 2023-12-05 PROCEDURE — 6370000000 HC RX 637 (ALT 250 FOR IP): Performed by: PSYCHIATRY & NEUROLOGY

## 2023-12-05 RX ADMIN — SERTRALINE 50 MG: 50 TABLET, FILM COATED ORAL at 09:03

## 2023-12-05 RX ADMIN — QUETIAPINE FUMARATE 25 MG: 25 TABLET ORAL at 09:03

## 2023-12-05 RX ADMIN — HYDROXYZINE PAMOATE 50 MG: 50 CAPSULE ORAL at 19:37

## 2023-12-05 RX ADMIN — QUETIAPINE FUMARATE 50 MG: 50 TABLET ORAL at 20:45

## 2023-12-05 NOTE — CARE COORDINATION
Family/Support Name: Carly Greer #: [phone]  Relationship to Pt[de-identified] Mom    Placed call to above for collateral information,    Response:  No answer, left voicemail requesting return call.

## 2023-12-05 NOTE — GROUP NOTE
Group Therapy Note    Date: 12/5/2023    Group Start Time: 1200  Group End Time: 200  Group Topic: Healthy Living/Wellness    [facility]    Edi Cruz RN; Peri Thomason RN        Group Therapy Note    Attendees: 8/15       Patient's Goal:  To participate in group    Notes:  Appropriate, interactive     Status After Intervention:  Improved    Participation Level: Interactive    Participation Quality: Appropriate      Speech:  normal      Thought Process/Content: Logical      Affective Functioning: Congruent      Mood: euthymic      Level of consciousness:  Alert and Oriented x4      Response to Learning: Progressing to goal      Endings: None Reported    Modes of Intervention: Education, Support, and Socialization      Discipline Responsible: Registered Nurse      Signature:  Edi Cruz RN

## 2023-12-05 NOTE — GROUP NOTE
Group Therapy Note    Date: 12/5/2023    Group Start Time: 1000  Group End Time: 1100  Group Topic: Recreational    [facility] 3W Jessica Solis        Group Therapy Note    Attendees: 9       Patient's Goal:  To not be in a bad mood     Notes:  Pt was attentive     Status After Intervention:  Unchanged    Participation Level: Interactive    Participation Quality: Attentive      Speech:  normal      Thought Process/Content: Logical      Affective Functioning: Congruent      Mood: euthymic      Level of consciousness:  Alert      Response to Learning: Able to verbalize current knowledge/experience      Endings: None Reported    Modes of Intervention: Activity      Discipline Responsible: Behavorial Health Tech      Signature:   Caro Mills

## 2023-12-05 NOTE — GROUP NOTE
Group Therapy Note    Date: 12/4/2023    Group Start Time: 1900  Group End Time: 1930  Group Topic: Recreational    [facility] 3W DANNY Jones, RN        Group Therapy Note    Attendees: 5/20       Patient's Goal:  to participate,( pt was attentive in his role of score keeping)    Status After Intervention:  Improved    Participation Level: Interactive    Participation Quality: Appropriate      Speech:  normal      Thought Process/Content: Logical  Linear      Affective Functioning: Congruent      Mood: euthymic      Level of consciousness:  Alert and Oriented x4      Response to Learning: Able to retain information      Endings: None Reported    Modes of Intervention: Socialization      Discipline Responsible: Registered Nurse      Signature: Nirmala Anaya

## 2023-12-05 NOTE — GROUP NOTE
Group Therapy Note    Date: 12/4/2023    Group Start Time: 1930  Group End Time: 2000  Group Topic: [address] 3W DANNY Vegas, RN        Group Therapy Note    Attendees: 5/19       Patient's Goal:  pt reports he met his goal      Status After Intervention:  Improved    Participation Level: Active Listener and Monopolizing    Participation Quality: Appropriate      Speech:  normal      Thought Process/Content: Logical  Linear      Affective Functioning: Incongruent      Mood: euthymic      Level of consciousness:  Alert and Oriented x4      Response to Learning: Able to retain information      Endings: None Reported    Modes of Intervention: Socialization      Discipline Responsible: Registered Nurse      Signature: Araceli Hardin

## 2023-12-05 NOTE — CARE COORDINATION
Family/Support Name: Shaneka Queen #: [phone]  Relationship to Patient: CM with [address] call to above for discharge planning,    Response:  Informed CM of patient's tentative discharge tomorrow. CM reports she just spoke to patient and pt did not mention DC. CM hesitant to reach out to pt's aide and schedule services without patient knowing about discharge and being involved. CM also states that on such short notice, aide might not be able to make it. CM insists patient be notified about discharge before she contacts his aide and sets up services. ...    1233: Notified pt of tentative discharge tomorrow. Pt states it is ok for CM Duke Greta to set up services with his aide for tomorrow. 1234: Call placed to [address] at this time. No answer, left message with the above information.

## 2023-12-06 PROCEDURE — 6370000000 HC RX 637 (ALT 250 FOR IP): Performed by: REGISTERED NURSE

## 2023-12-06 PROCEDURE — 6370000000 HC RX 637 (ALT 250 FOR IP): Performed by: PSYCHIATRY & NEUROLOGY

## 2023-12-06 PROCEDURE — 1240000000 HC EMOTIONAL WELLNESS R&B

## 2023-12-06 RX ORDER — CHLORHEXIDINE GLUCONATE 4 G/100ML
SOLUTION TOPICAL DAILY
Status: DISCONTINUED | OUTPATIENT
Start: 2023-12-06 | End: 2023-12-07 | Stop reason: HOSPADM

## 2023-12-06 RX ORDER — DOXYCYCLINE HYCLATE 100 MG/1
100 CAPSULE ORAL EVERY 12 HOURS SCHEDULED
Status: DISCONTINUED | OUTPATIENT
Start: 2023-12-06 | End: 2023-12-07 | Stop reason: HOSPADM

## 2023-12-06 RX ADMIN — QUETIAPINE FUMARATE 25 MG: 25 TABLET ORAL at 08:24

## 2023-12-06 RX ADMIN — QUETIAPINE FUMARATE 50 MG: 50 TABLET ORAL at 22:15

## 2023-12-06 RX ADMIN — SERTRALINE 50 MG: 50 TABLET, FILM COATED ORAL at 08:24

## 2023-12-06 RX ADMIN — DOXYCYCLINE HYCLATE 100 MG: 100 CAPSULE ORAL at 20:51

## 2023-12-06 NOTE — CARE COORDINATION
Family/Support Name: Silvia Dupree #: [phone]  Relationship to Patient: CM with [address] call to above for discharge planning,     Response:  CM reports an aide would be able to care for pt this evening, tomorrow morning, and tomorrow evening. CM reports she has no guarantees that an aide will be available after that, as they are unable to get in touch with pt's aide that works this weekend. Informed CM of discharge tomorrow, and will keep her updated with DC time.

## 2023-12-06 NOTE — GROUP NOTE
Group Therapy Note    Date: 12/6/2023    Group Start Time: 1250  Group End Time: 1330  Group Topic: Healthy Living/Wellness    [facility] 3W Domenico Harvey RN        Group Therapy Note    Attendees: 7/18       Patient's Goal:      Notes:      Status After Intervention:  Improved    Participation Level:  Active Listener    Participation Quality: Appropriate      Speech:  normal      Thought Process/Content: Logical      Affective Functioning: Congruent      Mood: depressed      Level of consciousness:  Alert      Response to Learning: Able to verbalize current knowledge/experience      Endings: None Reported    Modes of Intervention: Exploration      Discipline Responsible: Registered Nurse      Signature:  Patricia Live RN

## 2023-12-06 NOTE — CARE COORDINATION
Family/Support Name: Oneyda Singh #: [phone]  Relationship to Pt[de-identified] Mom    Placed call to above for collateral information,    Response:  No answer, left message requesting return call.

## 2023-12-06 NOTE — CARE COORDINATION
Pt religiously preoccupied reading the bible out loud in the dining room for over an hour. Peers sitting around him listening.

## 2023-12-06 NOTE — GROUP NOTE
Group Therapy Note    Date: 12/6/2023    Group Start Time: 1350  Group End Time: 3564  Group Topic: Healthy Living/Wellness    MLRONE[facility] 3W DANNY Feldman RN        Group Therapy Note    Attendees: 7/18     Patient's Goal:  to learn about community resources    Status After Intervention:  Improved    Participation Level: Active Listener and Interactive    Participation Quality: Appropriate and Attentive      Speech:  normal      Thought Process/Content: Logical  Linear      Affective Functioning: Congruent      Mood: anxious      Level of consciousness:  Alert and Oriented x4      Response to Learning: Able to verbalize current knowledge/experience and Able to verbalize/acknowledge new learning      Endings: None Reported    Modes of Intervention: Education, Support, and Socialization      Discipline Responsible: Registered Nurse      Signature: Pino Lira

## 2023-12-06 NOTE — GROUP NOTE
Group Therapy Note    Date: 12/6/2023    Group Start Time: 1000  Group End Time: 1100  Group Topic: Recreational    [facility] 3W Jessica Solis        Group Therapy Note    Attendees: 10       Patient's Goal:  To go home     Notes:  Pt was attentive     Status After Intervention:  Unchanged    Participation Level: Interactive    Participation Quality: Attentive      Speech:  normal      Thought Process/Content: Logical      Affective Functioning: Congruent      Mood: euthymic      Level of consciousness:  Alert      Response to Learning: Able to verbalize current knowledge/experience      Endings: None Reported    Modes of Intervention: Activity      Discipline Responsible: Behavorial Health Tech      Signature:   Caro Mills

## 2023-12-06 NOTE — CARE COORDINATION
Family/Support Name: Gerald Zhu #: [phone]  Relationship to Patient: CM with [address] call to above for discharge planning,    Response:  No answer, left message requesting return call.

## 2023-12-07 VITALS
HEART RATE: 100 BPM | RESPIRATION RATE: 17 BRPM | DIASTOLIC BLOOD PRESSURE: 94 MMHG | TEMPERATURE: 98.2 F | SYSTOLIC BLOOD PRESSURE: 137 MMHG | OXYGEN SATURATION: 98 %

## 2023-12-07 PROCEDURE — 6370000000 HC RX 637 (ALT 250 FOR IP): Performed by: REGISTERED NURSE

## 2023-12-07 PROCEDURE — 6370000000 HC RX 637 (ALT 250 FOR IP): Performed by: PSYCHIATRY & NEUROLOGY

## 2023-12-07 RX ORDER — DOXYCYCLINE HYCLATE 100 MG/1
100 CAPSULE ORAL EVERY 12 HOURS SCHEDULED
Qty: 8 CAPSULE | Refills: 0 | Status: SHIPPED | OUTPATIENT
Start: 2023-12-07 | End: 2023-12-11

## 2023-12-07 RX ORDER — QUETIAPINE FUMARATE 25 MG/1
TABLET, FILM COATED ORAL
Qty: 45 TABLET | Refills: 3 | Status: SHIPPED | OUTPATIENT
Start: 2023-12-08

## 2023-12-07 RX ADMIN — QUETIAPINE FUMARATE 25 MG: 25 TABLET ORAL at 09:22

## 2023-12-07 RX ADMIN — DOXYCYCLINE HYCLATE 100 MG: 100 CAPSULE ORAL at 09:22

## 2023-12-07 RX ADMIN — Medication 946 ML: at 09:52

## 2023-12-07 RX ADMIN — SERTRALINE 50 MG: 50 TABLET, FILM COATED ORAL at 09:22

## 2023-12-07 NOTE — DISCHARGE INSTRUCTIONS
Keep all follow up appointments, take medications as ordered, utilize positive supports, abstain from use of alcohol and drugs. If symptoms return or you feel at risk to yourself or others, please call 911, return the nearest emergency room, or call your local crisis hotline:  [facility]: [phone]: [phone]: [phone]    Due to the [address] [place] [address] Smoking [facility]essation [facility] is not currently available. For assistance with quitting smoking please go to https://smokefree.gov. A prescription for an FDA-approved tobacco cessation medication was offered at discharge and the patient refused. Someone from [address] [place] [address] will be calling you tomorrow to follow up on your care. If you don't hear from us, give us a call! [phone]. You were offered the flu vaccine on 12/7/23 you declined.

## 2023-12-07 NOTE — GROUP NOTE
Group Therapy Note    Date: 12/7/2023    Group Start Time: 0915  Group End Time: 1000  Group Topic: [address] 3[address] [facility]    Sandra Nick RN        Group Therapy Note    Attendees: 11/21       Patient's Goal:  \"get home to my Nintendo Switch\"    Notes:  grandiose    Status After Intervention:  Unchanged    Participation Level: Monopolizing    Participation Quality: Inappropriate      Speech:  normal      Thought Process/Content: Logical      Affective Functioning: Congruent      Mood: euthymic      Level of consciousness:  Alert      Response to Learning: Able to verbalize current knowledge/experience      Endings: None Reported    Modes of Intervention: Education      Discipline Responsible: Registered Nurse      Signature:  Sandra Nick RN

## 2023-12-07 NOTE — DISCHARGE SUMMARY
OPIATESCREENURINE Neg 11/27/2023 11:00 AM    PHENCYCLIDINESCREENURINE Neg 11/27/2023 11:00 AM    ETOH <10 11/27/2023 11:24 AM     Lab Results   Component Value Date/Time    TSH 1.590 11/27/2023 11:24 AM     No results found for: \"LITHIUM\"  No results found for: \"VALPROATE\", \"CBMZ\"    RISK ASSESSMENT AT DISCHARGE: Low risk for suicide and homicide. Treatment Plan:  Reviewed current Medications with the patient. Education provided on the complaince with treatment. Risks, benefits, side effects, drug-to-drug interactions and alternatives to treatment were discussed. Encourage patient to attend outpatient follow up appointment and therapy. Patient was advised to call the outpatient provider, visit the nearest ED or call 911 if symptoms are not manageable. Patient's family member was contacted prior to the discharge.          Medication List        START taking these medications      doxycycline hyclate 100 MG capsule  Commonly known as: VIBRAMYCIN  Take 1 capsule by mouth every 12 hours for 8 doses     QUEtiapine 25 MG tablet  Commonly known as: SEROQUEL  1 tab in the morning and 2 tab at night  Start taking on: December 8, 2023     sertraline 50 MG tablet  Commonly known as: ZOLOFT  Take 1 tablet by mouth daily  Start taking on: December 8, 2023               Where to Get Your Medications        These medications were sent to [facility], [address] [place] [address] Road  [address] [facility], [address] [place] [address] Drive      Phone: [phone]   doxycycline hyclate 100 MG capsule  QUEtiapine 25 MG tablet  sertraline 50 MG tablet           Reason for more than one antipsychotic:   [x] N/A  [] 3 failed monotherapy(drugs tried):  [] Cross over to a new antipsychotic  [] Taper to monotherapy from polypharmacy  [] Augmentation of Clozapine therapy due to treatment resistance to single therapy        TIME SPEND - 35 MINUTES TO COMPLETE THE EVALUATION, DISCHARGE SUMMARY,

## 2023-12-07 NOTE — GROUP NOTE
Group Therapy Note    Date: 12/7/2023    Group Start Time: 7367  Group End Time: 1100  Group Topic: Art Therapy     [facility] 3W I    Tone Felder RN        Group Therapy Note    Attendees: 8/21       Patient's Goal:      Notes:  appropriate, didn't stay long    Status After Intervention:  Unchanged    Participation Level:  Active Listener    Participation Quality: Appropriate      Speech:  normal      Thought Process/Content: Logical      Affective Functioning: Congruent      Mood: euthymic      Level of consciousness:  Alert      Response to Learning: Able to verbalize current knowledge/experience      Endings: None Reported    Modes of Intervention: Activity      Discipline Responsible: Registered Nurse      Signature:  Tone Felder RN

## 2023-12-07 NOTE — DISCHARGE INSTR - DIET

## 2023-12-07 NOTE — CARE COORDINATION
FAMILY COLLATERAL NOTE    Family/Support Name:  Adalberto Vincent   Contact #:[phone]   Relationship to Pt[de-identified] Mother    Family/Support contact aware of hospitalization: Yes    Presenting Symptoms/Current Concerns:  Patient sounded distant and mother knew something was not right. Top 3 Life Stressors:   Self hatred and upset with himself. Background History Relevant to Current Hospitalization:  Never hospitalized for mental illness in the past.    Family is from [place] and moved to [facility], he left home at 25 and moved to [place] to be with his friends. Family Mental Health/Substance Use History:   Patient drinks occasionally. Grandfather was an alcoholic    Discharge Plan:   Return to his apartment/ [address] [place] [address] Supportive of Discharge Plan:   Yes    Support can confirm Safety of Location and Security of Weapons:   No weapons    Support agreeable to Safeguard and Monitor Medications (including Prescription and OTC):   Patient has home health aids that will help him monitor. Identified Barriers to Compliance with Discharge Plan:   His mobility issues and medical issues    Recommendations for Support Network:     Available for follow up calls.     Electronically signed by JEROME García on 12/7/2023 at [address]

## 2023-12-07 NOTE — CARE COORDINATION
Family/Support Name: Rip Kyle #: [phone]  Relationship to Patient: CM with [address] call to above for discharge planning,    Response:  No answer, left message informing CM of discharge time and follow up appointments.

## 2023-12-07 NOTE — FLOWSHEET NOTE
Assisted pt to w/c. Out to DR for breakfast, then return to room to use BR. Pt reports \"a little bit\" of anxiety and depression. Denies SI/HI/AVH. Poor eye contact, fair appearance. Pt reports that he showered yesterday. He reports fair sleep and good appetite. Cooperative with care, guarded. 1:1 care maintained.